# Patient Record
Sex: FEMALE | Race: BLACK OR AFRICAN AMERICAN | NOT HISPANIC OR LATINO | URBAN - NONMETROPOLITAN AREA
[De-identification: names, ages, dates, MRNs, and addresses within clinical notes are randomized per-mention and may not be internally consistent; named-entity substitution may affect disease eponyms.]

---

## 2019-11-15 ENCOUNTER — IMPORTED ENCOUNTER (OUTPATIENT)
Dept: URBAN - NONMETROPOLITAN AREA CLINIC 1 | Facility: CLINIC | Age: 69
End: 2019-11-15

## 2019-11-15 PROCEDURE — 92004 COMPRE OPH EXAM NEW PT 1/>: CPT

## 2019-11-15 PROCEDURE — 92134 CPTRZ OPH DX IMG PST SGM RTA: CPT

## 2019-11-15 NOTE — PATIENT DISCUSSION
Discussed in depth with patient that decreased vision is not a result of injury. Cataract(s)-Visually significant cataract OU .-Cataract(s) causing symptomatic impairment of visual function not correctable with a tolerable change in glasses or contact lenses lighting or non-operative means resulting in specific activity limitations and/or participation restrictions including but not limited to reading viewing television driving or meeting vocational or recreational needs. -Expectation is clearer vision and functional improvement in symptoms as well as reduced glare disability after cataract removal.Patient to return for Cataract Evaluation when she decides to. 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 Refraction with Dr. Tunde Cat in 2 weeks. IF no improvement recommend patient have cataract evaluation.

## 2019-11-18 PROBLEM — H43.23: Noted: 2019-11-18

## 2019-11-18 PROBLEM — H25.813: Noted: 2019-11-15

## 2019-12-16 ENCOUNTER — IMPORTED ENCOUNTER (OUTPATIENT)
Dept: URBAN - NONMETROPOLITAN AREA CLINIC 1 | Facility: CLINIC | Age: 69
End: 2019-12-16

## 2019-12-16 PROCEDURE — 92012 INTRM OPH EXAM EST PATIENT: CPT

## 2019-12-16 PROCEDURE — 92015 DETERMINE REFRACTIVE STATE: CPT

## 2019-12-16 NOTE — PATIENT DISCUSSION
Cataract(s)-Visually significant cataract OU .-Cataract(s) causing symptomatic impairment of visual function not correctable with a tolerable change in glasses or contact lenses lighting or non-operative means resulting in specific activity limitations and/or participation restrictions including but not limited to reading viewing television driving or meeting vocational or recreational needs. -Expectation is clearer vision and functional improvement in symptoms as well as reduced glare disability after cataract removal.Patient to return for Cataract Evaluation when she decides to. 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 POSSIBLE AMBLYOPIA OSMONITOR; 's Notes: PCP: [place] [id number] [address]

## 2022-04-09 ASSESSMENT — VISUAL ACUITY
OS_PH: 20/70
OS_CC: 20/400
OD_CC: 20/70
OD_CC: 20/80
OD_PH: 20/40+
OS_SC: J2
OS_PH: 20/50-2
OD_PH: 20/40
OD_CC: 20/30
OS_CC: 20/50-2
OD_SC: J2
OS_CC: 20/200

## 2022-04-09 ASSESSMENT — TONOMETRY
OD_IOP_MMHG: 16
OS_IOP_MMHG: 16